# Patient Record
(demographics unavailable — no encounter records)

---

## 2024-11-06 NOTE — REASON FOR VISIT
[Follow Up] : a follow up visit [Parents] : parents [FreeTextEntry1] : s/p right foot lateral transfer of tibialis anterior tendon to lateral cuneiform, gastroc-soleus recession and posterior tibial fractional lengthening with application of SLC on 06/07/2023

## 2024-11-06 NOTE — ASSESSMENT
[FreeTextEntry1] : 9-year-old female with s/p right foot lateral transfer of tibialis anterior tendon to lateral cuneiform, gastroc-soleus recession and posterior tibial fractional lengthening with application of SLC on 06/07/2023.  Today's visit included obtaining the history from the child and parent, due to the child's age, the child could not be considered a reliable historian, requiring the parent to act as an independent historian. The condition, natural history, and prognosis were explained to the family. The clinical findings were reviewed with the family. She is doing well and has improved alignment of the foot. Recommendation for AFO brace on right foot and a new AFO for the left at the request of the PT. New helmet rx also given to the patient.  She will continue PT services. Possible surgery in the future may be considered but it was discussed that tendon transfers can sometimes make the deformity change and may not improve function, so always considered with caution.   We will plan to see her back in clinic in approximately 6 months for reevaluation.  All questions and concerns were addressed. Patient and parent vocalized understanding and agreement to assessment and treatment.  Shanell GONZALEZ MPAS, PAC, have acted as a scribe and documented the above for Dr. Méndez.  The above documentation completed by the scribe is an accurate record of both my words and actions.  JACEY

## 2024-11-06 NOTE — HISTORY OF PRESENT ILLNESS
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  Today father reports that she is doing well. She is currently in AFO brace right and SMO left. Father states her PT is requesting AFO for both feet and a new helmet for school. She is undergoing PT at school. She does not have any pain. The right foot still has the tendency to invert when ambulating making her unsteady when not in the brace but improving.  Here for further orthopedic evaluation.

## 2024-11-06 NOTE — PHYSICAL EXAM
[FreeTextEntry1] : GENERAL: alert, cooperative, in NAD SKIN: The skin is intact, warm, pink and dry over the area examined. EYES: Normal conjunctiva, normal eyelids and pupils were equal and round. ENT: normal ears, normal nose and normal lips. CARDIOVASCULAR: brisk capillary refill, but no peripheral edema. RESPIRATORY: The patient is in no apparent respiratory distress. They're taking full deep breaths without use of accessory muscles or evidence of audible wheezes or stridor without the use of a stethoscope. Normal respiratory effort. ABDOMEN: not examined SPINE: no evidence of asymmetry seated position.  Bilateral lower extremities AFOSMO braces removed. Skin intact, incisions well healed. Alignment of foot has improved compared to pre-operative.  Passively she has a brace able foot. When not in brace tendency to go into varus but improved compared to preop. . Prominent lateral malleolus but no breakdown. Able to dorsiflex foot to a neutral position, with preference for improved but persistent supination of foot brisk cap refill.

## 2024-11-06 NOTE — ASSESSMENT
[FreeTextEntry1] : 9-year-old female with s/p right foot lateral transfer of tibialis anterior tendon to lateral cuneiform, gastroc-soleus recession and posterior tibial fractional lengthening with application of SLC on 06/07/2023.  Today's visit included obtaining the history from the child and parent, due to the child's age, the child could not be considered a reliable historian, requiring the parent to act as an independent historian. The condition, natural history, and prognosis were explained to the family. The clinical findings were reviewed with the family. She is doing well and has improved alignment of the foot. Recommendation for AFO brace on right foot and a new AFO for the left at the request of the PT. New helmet rx also given to the patient.  She will continue PT services. Possible surgery in the future may be considered but it was discussed that tendon transfers can sometimes make the deformity change and may not improve function, so always considered with caution.   We will plan to see her back in clinic in approximately 6 months for reevaluation.  All questions and concerns were addressed. Patient and parent vocalized understanding and agreement to assessment and treatment.  Shanell GONZALEZ MPAS, PAC, have acted as a scribe and documented the above for Dr. Mnédez.  The above documentation completed by the scribe is an accurate record of both my words and actions.  JACEY

## 2024-11-06 NOTE — REVIEW OF SYSTEMS
[Change in Activity] : no change in activity [Fever Above 102] : no fever [Joint Pains] : no arthralgias

## 2025-05-07 NOTE — REASON FOR VISIT
[Follow Up] : a follow up visit [Father] : father [FreeTextEntry1] : s/p right foot lateral transfer of tibialis anterior tendon to lateral cuneiform, gastroc-soleus recession and posterior tibial fractional lengthening with application of SLC on 06/07/2023

## 2025-05-07 NOTE — PHYSICAL EXAM
[FreeTextEntry1] : GENERAL: alert, cooperative, in NAD SKIN: The skin is intact, warm, pink and dry over the area examined. EYES: Normal conjunctiva, normal eyelids and pupils were equal and round. ENT: normal ears, normal nose and normal lips. CARDIOVASCULAR: brisk capillary refill, but no peripheral edema. RESPIRATORY: The patient is in no apparent respiratory distress. They're taking full deep breaths without use of accessory muscles or evidence of audible wheezes or stridor without the use of a stethoscope. Normal respiratory effort. ABDOMEN: not examined SPINE: no evidence of asymmetry seated position.  Bilateral lower extremities Braces removed. Skin intact, incisions well healed. Alignment of foot has improved compared to pre-operative.  Passively she has a brace able foot. When not in brace tendency to go into varus but significant improvement compared to preop. Prominent lateral malleolus but no breakdown. Able to dorsiflex foot to a neutral position, with preference for improved but persistent supination of foot brisk cap refill.

## 2025-05-07 NOTE — ASSESSMENT
[FreeTextEntry1] : 9-year-old female with s/p right foot lateral transfer of tibialis anterior tendon to lateral cuneiform, gastroc-soleus recession and posterior tibial fractional lengthening with application of SLC on 06/07/2023.  Today's visit included obtaining the history from the child and parent, due to the child's age, the child could not be considered a reliable historian, requiring the parent to act as an independent historian. The condition, natural history, and prognosis were explained to the family. The clinical findings were reviewed with the family. Clinically she is doing well and alignment of the foot significantly improved. Recommendation for continuation of AFO braces on bilateral lower extremities. She will continue PT services. It was discussed that derotational osteotomy would be required to neutralize the position of the foot, which now sits plantigrade, but based on her exam findings today no surgery needed at this time.   We will plan to see her back in clinic in approximately 6 months for reevaluation.  All questions and concerns were addressed. Father vocalized understanding and agreement to assessment and treatment.  I, Pili Pal, have acted as a scribe and documented the above for Dr. Méndez.  The above documentation completed by the scribe is an accurate record of both my words and actions.  JACEY

## 2025-05-07 NOTE — HISTORY OF PRESENT ILLNESS
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  Today father reports that she is doing well. She is currently in AFO brace. She is undergoing PT at school. She does not have any pain. The right foot still has the tendency to invert when ambulating making her unsteady when not in the brace but improving.  Here for further orthopedic evaluation.